# Patient Record
(demographics unavailable — no encounter records)

---

## 2024-10-30 NOTE — DISCUSSION/SUMMARY
[de-identified] : I cleaned off some of the blood there and reassured her that this is normal.  Remain nonweightbearing.  Follow-up at regular scheduled follow-up.

## 2024-10-30 NOTE — HISTORY OF PRESENT ILLNESS
[de-identified] : Patient is here for first postop exam.  She just had surgery approximately 5 days ago however was concerned about some dried blood that she saw.  Her pain is otherwise well-controlled.  Does not endorse any other symptoms.

## 2024-10-30 NOTE — DATA REVIEWED
[FreeTextEntry1] : 3 views left foot ordered reviewed by me personally.  Well corrected bunion and hammertoe deformity is seen.

## 2024-10-30 NOTE — PHYSICAL EXAM
[de-identified] : There is gauze with some dried blood localized near the second toe.  No deformity otherwise.  The second toe pinning is still in place.  The hallux and toe are pointing straight.

## 2024-11-06 NOTE — HISTORY OF PRESENT ILLNESS
[de-identified] : 51-year-old patient here for second postop exam.  She is doing well.  She denies any fevers chills any drainage from the incision sites.

## 2024-11-06 NOTE — DISCUSSION/SUMMARY
[de-identified] : Sutures removed.  Steri-Strips placed.  She will heel weight-bear.  I will see her back in 2 weeks

## 2024-11-06 NOTE — PHYSICAL EXAM
[de-identified] : Dressing removed.  Incisions well-healed.  Sutures in place.  There is anatomic alignment of her hallux in a straight position.  Alignment of the second toe is maintained in a straight position.  Pin site clean dry and intact.

## 2024-11-20 NOTE — DATA REVIEWED
[FreeTextEntry1] : 3 views left foot ordered reviewed by me personally.  X-rays demonstrate a healing and stable Lapidus bunionectomy and second hammertoe correction.  No hardware complication.

## 2024-11-20 NOTE — HISTORY OF PRESENT ILLNESS
[de-identified] : Patient in for postop exam.  1 months after his Lapidus bunionectomy and second hammertoe correction.  She is doing well.  Pain is much better controlled.  No interval trauma.

## 2024-11-20 NOTE — PHYSICAL EXAM
[de-identified] : Hallux remains well corrected.  No recurrence.  Present to be joint congruent.  Second hammertoe correction in place.  Wire/pin site clean dry and intact.

## 2024-11-20 NOTE — DISCUSSION/SUMMARY
[de-identified] : Neck steps in treatment were discussed with the patient.  Continue nonweightbearing.  Continue dressing changes.  Continue toe spacer.  I will see her back in 2 weeks for removal of the K wire.

## 2024-12-04 NOTE — HISTORY OF PRESENT ILLNESS
[de-identified] : Patient comes in today for postop exam.  She is now approximately 6 weeks status post surgery.  Doing much better from a pain standpoint.  Did fall recently injuring her foot but that acute pain is since calm down.  Denies any fevers chills.

## 2024-12-04 NOTE — DATA REVIEWED
[FreeTextEntry1] : 3 views of the patient's left foot ordered and reviewed by me personally.  X-rays demonstrate evidence of a stable appearing correction of her bunion deformity with intact hardware.  Mallet toe remains well corrected.

## 2024-12-04 NOTE — PHYSICAL EXAM
[de-identified] : The incisions are well-healed.  No infection.  Grossly intact range of motion in place.  The pin sites are clean dry and intact.  No evidence of infection.  Neurovascular intact.
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
## 2025-01-15 NOTE — PHYSICAL EXAM
[Chaperone Declined] : Patient declined chaperone [Appropriately responsive] : appropriately responsive [Alert] : alert [No Acute Distress] : no acute distress [No Lymphadenopathy] : no lymphadenopathy [Soft] : soft [Non-tender] : non-tender [Non-distended] : non-distended [No HSM] : No HSM [No Lesions] : no lesions [No Mass] : no mass [Oriented x3] : oriented x3 [Examination Of The Breasts] : a normal appearance [No Discharge] : no discharge [No Masses] : no breast masses were palpable [Labia Majora] : normal [Labia Minora] : normal [Normal] : normal [Retroversion] : retroverted [Enlarged ___ wks] : enlarged [unfilled] ~Uweeks [Uterine Adnexae] : normal [FreeTextEntry6] : wnl [FreeTextEntry7] : obese

## 2025-01-15 NOTE — DISCUSSION/SUMMARY
[FreeTextEntry1] : 50yo P0 annual exam , fibroid uterus pap hpv Pelvic sono-to schedule mammogram 9/24 Colonoscopy 2019
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
## 2025-01-17 NOTE — HISTORY OF PRESENT ILLNESS
[de-identified] :  patient comes see me postop exam.  She is doing great.  She really has minimal to no pain.  She has been walking with the boot without difficulty.

## 2025-01-17 NOTE — DATA REVIEWED
[FreeTextEntry1] : 3 views left foot ordered reviewed by me personally.  X-rays demonstrate evidence of a well corrected bunion deformity.  No hardware complication.

## 2025-01-17 NOTE — DISCUSSION/SUMMARY
[de-identified] : Patient can transition to a sneaker at this point.  Avoid impact activity.  I will see her back in 6 weeks.

## 2025-01-17 NOTE — PHYSICAL EXAM
[de-identified] : Her incisions are well-healed and the swelling has improved dramatically.  The hallux remains in a straight position as has the second toe.

## 2025-01-22 NOTE — DISCUSSION/SUMMARY
[de-identified] : Cervical and lumbar spine pain follow-up  HPI Patient 51-year-old female accompanied by her  reports to office for subsequent evaluation of her cervical and lumbar spine pain.  She has been going to a chiropractor and wearing a back brace which has given her some relief.  She had MRIs and would like to go over the results of that.  Certain range of motion and palpating certain areas aggravate the patient's pain.  Denies any radiating pain down her upper or lower extremities.  Denies any numbness or tingling.  Cervical spine exam is as follows: No swelling noted. No erythema or ecchymosis. TTP paracervical spinal muscles. Limited range of motion with stiffness and pain. Positive Spurling's test. Decent strength in upper extremities. Light touch intact throughout.  Lumbar spine exam is as follows: No swelling noted. No erythema or ecchymosis. TTP paralumbar spinal muscles. Mild limited range of motion with stiffness and pain. Positive straight leg raise bilaterally. Decent strength in lower extremities. Light touch intact throughout. Mildly antalgic gait.  Cervical spine MRI done on 1/10/2025 reviewed with the patient in detail.  It revealed the following: - Cervical spondylosis at C4/C5 with a diffuse osteophytic ridge slightly compressing the ventral aspect of the cervical spinal cord. - Associated central small disc herniation extruded superiorly to the inferior endplate of C4. - Very small central subligamentous disc herniation at C6/C7 without spinal cord compression.  Lumbar spine MRI done on 1/10/2025 was reviewed with the patient in detail.  It revealed the following: - Very small central disc herniation L4/L5 without observable lumbar root compression or displacement.  Assessment/plan Explained MRI results in detail.  We will continue with conservative management.  A script for physical therapy was printed for the patient so they can get started on that.  Continue meloxicam and tizanidine as needed for pain.  Follow-up in 4 to 6 weeks with our spine specialist for further evaluation.  All question/concerns were answered in detail.

## 2025-03-05 NOTE — DISCUSSION/SUMMARY
[de-identified] : She has no restrictions at this point.  Continue with activity as tolerated.  She wants to get the other side done but would like to wait until the winter.  She will come and see me in late summer for preoperative examination.

## 2025-03-05 NOTE — PHYSICAL EXAM
[de-identified] : Her hallux remains in a well corrected position.  There is no interval displacement.  She has excellent range of motion passively and actively.  There is no more swelling.  She is neurovascular intact.

## 2025-03-05 NOTE — DATA REVIEWED
[FreeTextEntry1] : 3 views of the patient's left foot ordered reviewed by me personally.  X-rays demonstrate evidence of a healed Lapidus bunionectomy with intact hardware.  No interval displacement.

## 2025-03-05 NOTE — HISTORY OF PRESENT ILLNESS
[de-identified] : Patient comes in today for postop exam.  She is doing great.  She status post left Lapidus bunionectomy and she is doing very well.  She really has no issues involving the foot.  At times she feels a bump on top and is concerned about the screws but overall once again is doing much better.

## 2025-04-03 NOTE — IMAGING
[de-identified] : TTP midline cervical spine and paraspinal musculature   Strength                                                                     Deltoid   Right: 5/5; Left: 5/5                      Biceps   Right: 5/5; Left: 5/5                   Triceps        Right: 5/5; Left: 5/5                                 Wrist Extensors     Right: 5/5; Left: 5/5 Finger Flexors     Right: 5/5; Left: 5/5 IO    Right: 5/5; Left: 5/5  Sensation C5   Right: 2/2; Left: 2/2 C6   Right: 2/2; Left: 2/2 C7   Right: 2/2; Left: 2/2 C8   Right: 2/2; Left: 2/2 T1   Right: 2/2; Left: 2/2  Reflexes Biceps   Right: 2+; Left 2+ Triceps   Right: 2+; Left 2+ Caceres's  Right: Negative; L: Negative TTP midline spine and paraspinal musculature  Strength                                          Hip flexor   Right: 5/5; Left: 5/5                              Knee extensor     Right: 5/5; Left: 5/5                      Ankle dorsiflexion   Right: 5/5; Left: 5/5                   EHL           Right: 5/5; Left: 5/5                                 Ankle plantarflexion       Right: 5/5; Left: 5/5  Sensation L1   Right: 2/2; Left: 2/2 L2   Right: 2/2; Left: 2/2 L3   Right: 2/2; Left: 2/2 L4   Right: 2/2; Left: 2/2 L5   Right: 2/2; Left: 2/2 S1   Right: 2/2; Left: 2/2  Reflexes Patella   Right: 2+; Left 2+ Achilles   Right: 2+; Left 2+ Clonus  Right: absent; L: absent

## 2025-04-03 NOTE — DISCUSSION/SUMMARY
[de-identified] : 51-year-old female lumbar and cervical pain.  She has no symptoms of spinal cord compression I discussed with her cervical myelopathy with clinic L4.  She will follow-up if she develops any new symptoms.  Otherwise and given prescriptions for physical therapy.  Follow-up as needed.

## 2025-04-03 NOTE — DATA REVIEWED
[FreeTextEntry1] : Reviewed the patient MRI of her cervical and lumbar spine.  Cervical spine the patient disc herniation C4-5 causing cord compression of the lumbar spine the patient has some mild degenerative disc disease.
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
## 2025-05-14 NOTE — ASSESSMENT
[FreeTextEntry1] : The patient had chest pain of uncertain etiology . This occurred around the time of her mothers unexpected death . The patient had bariatric surgery and is no longer diabetic . HDL and LDL are at goal and BP ius normal . No acute ECG changes .

## 2025-05-14 NOTE — PHYSICAL EXAM
[General Appearance - Well Developed] : well developed [Normal Appearance] : normal appearance [Well Groomed] : well groomed [General Appearance - Well Nourished] : well nourished [No Deformities] : no deformities [General Appearance - In No Acute Distress] : no acute distress [Normal Conjunctiva] : the conjunctiva exhibited no abnormalities [Eyelids - No Xanthelasma] : the eyelids demonstrated no xanthelasmas [Normal Oral Mucosa] : normal oral mucosa [No Oral Pallor] : no oral pallor [No Oral Cyanosis] : no oral cyanosis [Respiration, Rhythm And Depth] : normal respiratory rhythm and effort [Exaggerated Use Of Accessory Muscles For Inspiration] : no accessory muscle use [Auscultation Breath Sounds / Voice Sounds] : lungs were clear to auscultation bilaterally [Abdomen Soft] : soft [Abdomen Tenderness] : non-tender [Abdomen Mass (___ Cm)] : no abdominal mass palpated [Abnormal Walk] : normal gait [Gait - Sufficient For Exercise Testing] : the gait was sufficient for exercise testing [Nail Clubbing] : no clubbing of the fingernails [Cyanosis, Localized] : no localized cyanosis [Petechial Hemorrhages (___cm)] : no petechial hemorrhages [Skin Color & Pigmentation] : normal skin color and pigmentation [] : no rash [No Venous Stasis] : no venous stasis [Skin Lesions] : no skin lesions [No Skin Ulcers] : no skin ulcer [No Xanthoma] : no  xanthoma was observed [Oriented To Time, Place, And Person] : oriented to person, place, and time [Affect] : the affect was normal [Mood] : the mood was normal [No Anxiety] : not feeling anxious [Normal Rate] : normal [Rhythm Regular] : regular [No Murmur] : no murmurs heard [2+] : left 2+ [No Pitting Edema] : no pitting edema present [FreeTextEntry1] : No bruits [Rt] : no varicose veins of the right leg [Lt] : no varicose veins of the left leg

## 2025-05-14 NOTE — HISTORY OF PRESENT ILLNESS
[FreeTextEntry1] :  The patient's mother recently passed away . She had mid sternal chest pain after the news of her mother passing . Now this is improved. The patient had bariatric surgery in 2020 and she hhad lost a significant amount of weight . The patient has no previous cardiac history and had a negative ischemia work up prior to gastric sleeve procedure.

## 2025-05-14 NOTE — CARDIOLOGY SUMMARY
[___] : [unfilled] [de-identified] : 5- NSR Precordial lead low voltage .  [de-identified] : 9- ETT Echo completed Stage II No echo  ischemia . mild MR and TR